# Patient Record
Sex: FEMALE
[De-identification: names, ages, dates, MRNs, and addresses within clinical notes are randomized per-mention and may not be internally consistent; named-entity substitution may affect disease eponyms.]

---

## 2018-06-03 ENCOUNTER — HOSPITAL ENCOUNTER (EMERGENCY)
Dept: HOSPITAL 25 - UCEAST | Age: 38
Discharge: HOME | End: 2018-06-03
Payer: COMMERCIAL

## 2018-06-03 VITALS — SYSTOLIC BLOOD PRESSURE: 119 MMHG | DIASTOLIC BLOOD PRESSURE: 61 MMHG
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
## 2018-06-03 DIAGNOSIS — S63.681A: ICD-10-CM

## 2018-06-03 DIAGNOSIS — S66.011A: Primary | ICD-10-CM

## 2018-06-03 PROCEDURE — 99201: CPT

## 2018-06-03 PROCEDURE — G0463 HOSPITAL OUTPT CLINIC VISIT: HCPCS

## 2018-06-03 NOTE — UC
Andres HIGGINS Gabriel, scribed for [facility]hel[facility] HealthJesus MD on 06/03/18 at 1229 .





Hand/Wrist HPI





- HPI Summary


HPI Summary: 





This patient is a 37 year old F [place] [facility] [place] 

thumb while exercising today. Pt had a similar injury 10 days ago but assumed 

it was a sprain and let it rest. After reinjuring it today she reports redness 

and swelling. The patient rates the pain 5/10 in severity. Patient reports 

hyperextension of the thumb. Pt denies n/v/d. 





MD note: vital signs stable, afebrile. Visit history noncontributory to present 

complaint. 





Nurses note: pt states she bent her rt thumb back about a week and  a half ago. 

pt states she assumed it was sprained so she let it go. she was exercising this 

am and it strated to hurt more and it now looks more swollen.








- History Of Current Complaint


Chief Complaint: UCUpperExtremity


Stated Complaint: THUMB INJURY


Time Seen by Provider: 06/03/18 12:26


Hx Obtained From: Patient


Onset/Duration: Lasting Weeks, Still Present, Worse Since - today


Severity Initially: Mild


Severity Currently: Moderate


Pain Intensity: 5


Pain Scale Used: 0-10 Numeric


Associated Signs And Symptoms: Positive: Swelling, Redness


Related History: Similar Episode/Dx As





- Allergies/Home Medications


Allergies/Adverse Reactions: 


 Allergies











Allergy/AdvReac Type Severity Reaction Status Date / Time


 


No Known Allergies Allergy   Verified 06/03/18 12:17











Home Medications: 


 Home Medications





NK [No Home Medications Reported]  06/03/18 [History Confirmed 06/03/18]











PMH/Surg Hx/FS Hx/Imm Hx


Previously Healthy: Yes


Other History Of: 


   Negative For: Hepatitis C, Anticoagulant Therapy





- Surgical History


Surgical History: Yes


Surgery Procedure, Year, and Place: back





- Family History


Known Family History: 


   Negative: Cardiac Disease, Hypertension, Renal Disease, Respiratory Disease, 

Blood Disorder





- Social History


Occupation: Employed Full-time


Lives: With Family


Alcohol Use: Occasionally


Substance Use Type: None


Smoking Status (MU): Never Smoked Tobacco





Review of Systems


Skin: Other - redness


Gastrointestinal: Negative - N/V/D


Musculoskeletal: Other: - right thumb pain and swelling


All Other Systems Reviewed And Are Negative: Yes





- Comments


Additional Review of Systems Comments: 





Positive: redness and swelling. Negative: n/v/d. 





Physical Exam





- Summary


Physical Exam Summary: 





Appearance: The patient is well-appearing, is in no pain distress, and is well-

nourished.


Eyes: Conjunctiva are clear.


ENT: The hearing is grossly normal, the pharynx is normal, and the TMs are 

normal. There is no muffled or hoarse voice.


Neck: The neck is supple and there is no lymphadenopathy.


Respiratory: The chest is nontender. The lungs are clear, there are normal 

breath sounds, and there is no respiratory distress.


Cardiovascular: Heart is regular rate and rhythm. There is no murmur.


Abdomen: The abdomen is soft and nontender. There is no organomegaly.


Bowel sounds: present


Musculoskeletal: Strength is intact. The patient moves all extremities. RUE HAS 

NORMAL ROM AT ELBOW AND WRIST. FLEXION OF THE RIGHT THUMB CREATES MILD 

DISCOMFORT. FLEXOR TENDON INTACT. STRESSOR AT ULNAR COLLATERAL LIGAMENT IS 

NEGATIVE AT RIGHT THUMB. TTP AT THENAR EMINENCE 


Neurological: The patient is alert.


Psychological: The patient displays age appropriate behavior


Skin: Negative for rashes. 


 





Triage Information Reviewed: Yes


Vital Signs: 


 Initial Vital Signs











Temp  98.8 F   06/03/18 12:09


 


Pulse  77   06/03/18 12:09


 


Resp  18   06/03/18 12:09


 


BP  119/61   06/03/18 12:09


 


Pulse Ox  99   06/03/18 12:09











Vital Signs Reviewed: Yes





Diagnostics





- Radiology


  ** thumb xray


Radiology Interpretation Completed By: Radiologist - Negative exam. Dr. Esparza has reviewed this report.





Hand/Wrist Course/Dx





- Course


Course Of Treatment: Healthy 38 y/o female with soft tissue injury of the right 

thumb will use thumb Spica splint and restrict activity until she is pain free. 

Xray was negative for fracture. Medications have been included in the original 

chart and reviewed.





- Differential Dx/Diagnosis


Differential Diagnosis/HQI/PQRI: Other - soft tissue injury vs fracture


Provider Diagnoses: thumb injury right hand: sprain of flexor tendon, strain of 

muscles in the thenar eminence





Discharge





- Sign-Out/Discharge


Documenting (check all that apply): Discharge/Admit/Transfer





- Discharge Plan


Condition: Stable


Disposition: HOME


Patient Education Materials:  Skier's Thumb (ED)


Referrals: 


No Primary Care Phys,NOPCP [Primary Care Provider] - 


Additional Instructions: 





For pain or discomfort use: Ibuprofen 400-600mg PLUS acetaminophen 500mg - 

1000mg every 8 hours. Maximum is 3 doses a day. If this dosage is required for 

more than 5 days, you should re-check with your doctor. PLEASE SEEK CARE AT THE 

EMERGENCY DEPARTMENT IF SYMPTOMS WORSEN OR IF NEW SYMPTOMS DEVELOP. 





FOLLOW UP WITH YOUR PRIMARY CARE PHYSICIAN 





AS WE DISCUSSED:





1. You have soft tissue injury of your right thumb involving the tendon that 

bends the thumb and the muscles at the base of the thumb.  There is no fracture.





2.  Use thumb spica; elevate;  warm moist heat in the morning;  ice to area 

after use for acute pain.  Use spica until you are pain free for a day.





3.  Don't go back to full activity until you are pain free without the splint 

for 3 days.





4.  Call with any questions or concerns.





- Billing Disposition and Condition


Condition: STABLE


Disposition: HOME





The documentation as recorded by the Andres traylor Gabriel accurately reflects 

the service I personally performed and the decisions made by me, Jesus Esparza MD.

## 2018-06-03 NOTE — RAD
INDICATION: RIGHT thumb pain following hyperextension injuries.



COMPARISON: No relevant prior exams available on the [facility] PACS for comparison.



TECHNIQUE: AP, lateral, and oblique views RIGHT hand.



REPORT: Negative for fracture or malalignment. Small bone island at the tuft of the third

distal phalanx without concern. No significant arthropathic change. Unremarkable soft

tissue contours.



IMPRESSION:  Negative exam.